# Patient Record
Sex: FEMALE | URBAN - METROPOLITAN AREA
[De-identification: names, ages, dates, MRNs, and addresses within clinical notes are randomized per-mention and may not be internally consistent; named-entity substitution may affect disease eponyms.]

---

## 2023-07-20 ENCOUNTER — OFFICE VISIT (OUTPATIENT)
Dept: PLASTIC SURGERY | Facility: CLINIC | Age: 68
End: 2023-07-20

## 2023-07-20 DIAGNOSIS — Z41.1 ENCOUNTER FOR COSMETIC PROCEDURE: Primary | ICD-10-CM

## 2023-07-20 NOTE — NURSING NOTE
Updated photodocumentation obtained.    Patient given quote for cosmetic procedures at appointment today. Quote was explained in detail, including but not limited to the $500.00 non-refundable deposit due at time of scheduling, when/where payment is due, facility fees being subject to change, and six weeks minimum cancellation notice. Patient verbalized understanding of quote. Signed quote was obtained (see media tab), a copy sent home with pt. Education for quoted procedures was provided both verbally and in educational take home folder including pre & post op care, medications to avoid before and after surgery, and more. All questions answered at this time. Pt will reach out if questions arise.    Sherita Pearce RN  7/20/2023 5:15 PM

## 2023-07-20 NOTE — LETTER
July 20, 2023  Re: Carina Townsend  1955      Dear Dr. Max,    Thank you so much for referring Carina Townsend to the [facility]. I had the pleasure of visiting with Carina today.     Attached you will find a copy of my note. Please feel free to reach out to me with any questions, [phone].     This office note has been dictated.         Your trust in our practice and care is much appreciated.    Sincerely,    ANGEL STEVENSON MD

## 2023-07-20 NOTE — LETTER
7/20/2023       RE: Carina Townsend  [address] [address]       Dear Colleague,      Thank you for referring your patient, Carina Townsend, to the [facility] at [facility]. Please see a copy of my visit note below.    This office note has been dictated.         Again, thank you for allowing me to participate in the care of your patient.      Sincerely,    ANGEL STVEENSON MD

## 2023-07-24 NOTE — PROGRESS NOTES
Service Date: 07/20/2023    REASON FOR VISIT: Carina is in to see us for evaluation of facial rejuvenation.      HISTORY OF PRESENT ILLNESS: I have cared for her over the past with skin cancer reconstruction, particularly a composite graft from the left ear to the nose.  She wants to know about a face/neck lift.  I discussed with her a face/neck lift in the past.  She does not have any dry eye or visual problems.  She wears glasses.      ALLERGIES:  She has no known allergies.      MEDICATIONS:  She takes no medications.      HABITS:  She is a nonsmoker.      PHYSICAL EXAMINATION: She has Carbajal I skin, some volume deflation, relatively thin skin with lots of superficial rhytids.  She has had an endoscopic brow lift elsewhere in the past.  That has actually held up quite well.  She has some redundant upper eyelid skin but not a significant amount of ptosis.  She has significant lower lid pseudo fat herniation.  She has relatively good skin tone.  Some skin excess and again fine rhytids.  She has volume loss in the mid face.  She has significant platysmal banding and degradation of the platysmal muscle with stranding.      RECOMMENDATIONS/PLAN: I would recommend a submentoplasty with plication, a facelift, an upper lid blepharoplasty and a transconjunctival lower lid blepharoplasty with fat excision laterally, repositioning centrally and nasally, and a skin pinch.      The risks and benefits of surgery including blindness, double vision, infection, delayed healing, ecchymosis, hematoma, motor sensory nerve problems, irregular scars, hair loss, etc., were discussed. If she wants to proceed, I would be happy to help her.  Our staff will provide her a quote.  I would honor her previous quote from several years ago for the facelift.   She will be in contact with us if she has any questions.      Familia Mijares MD        D: 07/22/2023   T: 07/24/2023   MT: ms    Name:     CARINA SHELTON  MRN:      [id number]         Account:      [id number]   :      1955           Service Date: 2023       Document: [id number]

## 2023-11-11 DIAGNOSIS — Z98.890 POSTOPERATIVE STATE: Primary | ICD-10-CM

## 2023-11-11 RX ORDER — ACETAMINOPHEN 500 MG
500 TABLET ORAL EVERY 6 HOURS
Qty: 12 TABLET | Refills: 0 | Status: SHIPPED | OUTPATIENT
Start: 2023-11-11 | End: 2023-11-14

## 2023-11-11 RX ORDER — ONDANSETRON 4 MG/1
4 TABLET, ORALLY DISINTEGRATING ORAL EVERY 8 HOURS PRN
Qty: 12 TABLET | Refills: 0 | Status: SHIPPED | OUTPATIENT
Start: 2023-11-11

## 2023-11-11 RX ORDER — OXYCODONE HYDROCHLORIDE 5 MG/1
5 TABLET ORAL EVERY 6 HOURS PRN
Qty: 20 TABLET | Refills: 0 | Status: SHIPPED | OUTPATIENT
Start: 2023-11-11

## 2023-11-11 RX ORDER — CEPHALEXIN 500 MG/1
500 CAPSULE ORAL 2 TIMES DAILY
Qty: 14 CAPSULE | Refills: 0 | Status: SHIPPED | OUTPATIENT
Start: 2023-11-11 | End: 2023-11-18

## 2023-11-12 DIAGNOSIS — Z98.890 POSTOPERATIVE STATE: Primary | ICD-10-CM

## 2023-11-12 RX ORDER — TOBRAMYCIN 3 MG/ML
2 SOLUTION/ DROPS OPHTHALMIC EVERY 4 HOURS
Qty: 5 ML | Refills: 3 | Status: SHIPPED | OUTPATIENT
Start: 2023-11-12 | End: 2023-11-19

## 2023-11-15 ENCOUNTER — TRANSFERRED RECORDS (OUTPATIENT)
Dept: HEALTH INFORMATION MANAGEMENT | Facility: CLINIC | Age: 68
End: 2023-11-15

## 2023-11-16 ENCOUNTER — OFFICE VISIT (OUTPATIENT)
Dept: PLASTIC SURGERY | Facility: CLINIC | Age: 68
End: 2023-11-16

## 2023-11-16 DIAGNOSIS — Z98.890 POSTOPERATIVE STATE: Primary | ICD-10-CM

## 2023-11-16 NOTE — PROGRESS NOTES
Jb is back after her facelift yesterday and she has done well overnight.  She has had minimal drainage output the flaps are all intact, and all branches of the facial nerve are intact.  There is no hematoma.  She does have conjunctival edema bilaterally but her vision is good.  We placed the elastic garment today and talked about her cleansing and reviewed with her her medications again.  She will see us next week for suture removal.    ANGEL STEVENSON MD

## 2023-11-22 ENCOUNTER — OFFICE VISIT (OUTPATIENT)
Dept: PLASTIC SURGERY | Facility: CLINIC | Age: 68
End: 2023-11-22

## 2023-11-22 DIAGNOSIS — Z98.890 POSTOPERATIVE STATE: Primary | ICD-10-CM

## 2023-11-27 NOTE — PROGRESS NOTES
Saw Carina one week s/p face/neck lift, u/l blepharoplasty, & fat grafting to cheeks (11/15/23).    She is doing well and looks great! She reports she has had little to no pain, she has found the post op period to be easy. She is in positive spirits. We discussed continued cares and healing expectations. She verbalized understanding of post op cares, realistic expectations, and follow up. Her incisions were cleansed, they are well approximated. Sutures and staples removed without difficulty, spare two sutures above and below bilateral ears left for removal in one week. All of her questions were answered, she will call the clinic if new questions or concerns arise.    Follow-up scheduled.    Sherita Pearce RN  11/27/2023 10:28 AM

## 2023-11-29 ENCOUNTER — OFFICE VISIT (OUTPATIENT)
Dept: PLASTIC SURGERY | Facility: CLINIC | Age: 68
End: 2023-11-29

## 2023-11-29 DIAGNOSIS — Z98.890 POSTOPERATIVE STATE: Primary | ICD-10-CM

## 2023-12-01 ENCOUNTER — OFFICE VISIT (OUTPATIENT)
Dept: PLASTIC SURGERY | Facility: CLINIC | Age: 68
End: 2023-12-01

## 2023-12-01 DIAGNOSIS — Z98.890 POSTOPERATIVE STATE: Primary | ICD-10-CM

## 2023-12-01 DIAGNOSIS — H11.423 CHEMOSIS OF CONJUNCTIVA OF BOTH EYES: ICD-10-CM

## 2023-12-01 RX ORDER — TOBRAMYCIN AND DEXAMETHASONE 3; 1 MG/ML; MG/ML
1-2 SUSPENSION/ DROPS OPHTHALMIC 4 TIMES DAILY
Qty: 2.8 ML | Refills: 0 | Status: SHIPPED | OUTPATIENT
Start: 2023-12-01 | End: 2023-12-08

## 2023-12-01 NOTE — LETTER
December 1, 2023  Re: Carina Townsend  1955      Dear Dr. Max,    Thank you so much for referring Carina Townsend to the [facility]. I had the pleasure of visiting with Carina today.     Attached you will find a copy of my note. Please feel free to reach out to me with any questions, [phone].     Breann is back after her surgery and she is doing the eyedrops as recommended.  The Steri-Strips support of her lower lids are still present I removed the one on the left side as the lid was more inverted there.  I suspect that the suspension suture broke after surgery causing the left lid droop more than the right I discussed this with the patient.  With that in mind I put a Paris suture in on the left side put Xylocaine and then Xylocaine with epinephrine in and then used a 5-0 nylon and Telfa pledgets to suspend the lid with marked improvement in the lid posture Home cares were discussed.  Other wounds were cleaned.  New prescription for eyedrops was sent to her pharmacy and she will see us next Thursday.      Your trust in our practice and care is much appreciated.    Sincerely,    ANGEL STEVENSON MD

## 2023-12-01 NOTE — PROGRESS NOTES
Breann is back after her surgery and she is doing the eyedrops as recommended.  The Steri-Strips support of her lower lids are still present I removed the one on the left side as the lid was more inverted there.  I suspect that the suspension suture broke after surgery causing the left lid droop more than the right I discussed this with the patient.  With that in mind I put a Paris suture in on the left side put Xylocaine and then Xylocaine with epinephrine in and then used a 5-0 nylon and Telfa pledgets to suspend the lid with marked improvement in the lid posture Home cares were discussed.  Other wounds were cleaned.  New prescription for eyedrops was sent to her pharmacy and she will see us next Thursday.ANGEL STEVENSON MD

## 2023-12-01 NOTE — LETTER
12/1/2023       RE: Carina Townsend  [address] [address]       Dear Colleague,    Thank you for referring your patient, Carina Townsend, to the  [facility] at [facility]. Please see a copy of my visit note below.    Breann is back after her surgery and she is doing the eyedrops as recommended.  The Steri-Strips support of her lower lids are still present I removed the one on the left side as the lid was more inverted there.  I suspect that the suspension suture broke after surgery causing the left lid droop more than the right I discussed this with the patient.  With that in mind I put a Paris suture in on the left side put Xylocaine and then Xylocaine with epinephrine in and then used a 5-0 nylon and Telfa pledgets to suspend the lid with marked improvement in the lid posture Home cares were discussed.  Other wounds were cleaned.  New prescription for eyedrops was sent to her pharmacy and she will see us next Thursday.      Again, thank you for allowing me to participate in the care of your patient.      Sincerely,    ANGEL STEVENSON MD

## 2023-12-04 NOTE — PROGRESS NOTES
Tai Carina one week s/p face/neck lift, u/l bleph, & fat grafting to cheeks (11/15/23).    Incision cleansed with hydrogen peroxide. Remaining sutures removed from bilateral ears. Her incisions are well approximated, she is still edematous and erythremic as expected. She has been diligent with her cares. Pt has ectropion lower eyelids with left worse than right. Dr. George brought in to advise on care. Pts lower eyelids tapped to provide support. She is using a lubricating eyedrop. Image of pts eyelids shown to Dr. Mijares. She will follow up this Friday with Dr. Mijares to be further assessed and potentially have a frost suture placed. Until this appt, pt instructed to keep eyelids tapped. Pt sent home with tape. She should reach out if questions or concerns arise before her next scheduled follow up.    Follow-up scheduled.    Sherita Pearce RN  12/4/2023 11:01 AM

## 2023-12-07 ENCOUNTER — OFFICE VISIT (OUTPATIENT)
Dept: PLASTIC SURGERY | Facility: CLINIC | Age: 68
End: 2023-12-07

## 2023-12-07 DIAGNOSIS — Z98.890 POSTOPERATIVE STATE: Primary | ICD-10-CM

## 2023-12-07 NOTE — LETTER
December 7, 2023  Re: Carina Townsend  1955      Dear Dr. Max,    Thank you so much for referring Carina Townsend to the [facility]. I had the pleasure of visiting with Carina today.     Attached you will find a copy of my note. Please feel free to reach out to me with any questions, [phone].     Ele is in after her Paris suture last week and is provided marked improvement.  I removed the sutures today and I am going to have her use paper tape to support the lids as it benefits both sides.  She will continue with this approach and the moisturizers.  Ecchymosis is improved.  I will see her again in 2 weeks at that time I want to talk with her about starting some exercises overall she is very happy with the outcome.      Your trust in our practice and care is much appreciated.    Sincerely,    ANGEL STEVENSON MD

## 2023-12-07 NOTE — LETTER
12/7/2023       RE: Carina Townsend  [address] [address]       Dear Colleague,    Thank you for referring your patient, Carina Townsend, to the  [facility] at [facility]. Please see a copy of my visit note below.    Ele is in after her Paris suture last week and is provided marked improvement.  I removed the sutures today and I am going to have her use paper tape to support the lids as it benefits both sides.  She will continue with this approach and the moisturizers.  Ecchymosis is improved.  I will see her again in 2 weeks at that time I want to talk with her about starting some exercises overall she is very happy with the outcome.      Again, thank you for allowing me to participate in the care of your patient.      Sincerely,    ANGEL STEVENSON MD

## 2023-12-07 NOTE — PROGRESS NOTES
Ele is in after her Frost suture last week and is provided marked improvement.  I removed the sutures today and I am going to have her use paper tape to support the lids as it benefits both sides.  She will continue with this approach and the moisturizers.  Ecchymosis is improved.  I will see her again in 2 weeks at that time I want to talk with her about starting some exercises overall she is very happy with the outcome.  ANGEL STEVENSON MD

## 2023-12-21 ENCOUNTER — OFFICE VISIT (OUTPATIENT)
Dept: PLASTIC SURGERY | Facility: CLINIC | Age: 68
End: 2023-12-21

## 2023-12-21 DIAGNOSIS — Z98.890 POSTOPERATIVE STATE: Primary | ICD-10-CM

## 2023-12-21 NOTE — PROGRESS NOTES
Ele is in to see us today in follow-up of her facelift and eyelid surgery as she says she would like to have the facelift pulled tighter to eliminate the nasolabial creases and marionette lines.  She has a great cheery attitude about this and I informed her again that pulling so hard on the skin to efface the nasal labial creases and marionette lines does not work and distorts the mouth and unattractive way.  We talked about the use of filler and I used some of our teaching stock and injected 1 cc of Juvéderm ultra plus into the oral commissure grooves and marionette lines and small amount in the nasolabial crease on the right.  Her lateral lid on the left is still down slightly she is still taping it at night it is improved the right side is better than the left.  I think the canthopexy suture on the left may have broken at this point and just can have her continue to tape and carry out squinting exercises.  I would like for her to see Dr. George or Dr. Moreno in late January.  I do not think she would need a steroid injection but it is still quite problematic I would then start looking toward an in office canthopexy to be performed when I return.  Patient is understanding of this and will make proper arrangements with our staff.  I want to get photos the next time she is in.  ANGEL STEVENSON MD

## 2023-12-21 NOTE — LETTER
December 21, 2023  Re: Carina Townsend  1955      Dear Dr. Max,    Thank you so much for referring Carina Townsend to the [facility]. I had the pleasure of visiting with Carina today.     Attached you will find a copy of my note. Please feel free to reach out to me with any questions, [phone].     Ele is in to see us today in follow-up of her facelift and eyelid surgery as she says she would like to have the facelift pulled tighter to eliminate the nasolabial creases and marionette lines.  She has a great cheery attitude about this and I informed her again that pulling so hard on the skin to efface the nasal labial creases and marionette lines does not work and distorts the mouth and unattractive way.  We talked about the use of filler and I used some of our teaching stock and injected 1 cc of Juvéderm ultra plus into the oral commissure grooves and marionette lines and small amount in the nasolabial crease on the right.  Her lateral lid on the left is still down slightly she is still taping it at night it is improved the right side is better than the left.  I think the canthopexy suture on the left may have broken at this point and just can have her continue to tape and carry out squinting exercises.  I would like for her to see Dr. George or Dr. Moerno in late January.  I do not think she would need a steroid injection but it is still quite problematic I would then start looking toward an in office canthopexy to be performed when I return.  Patient is understanding of this and will make proper arrangements with our staff.  I want to get photos the next time she is in.      Your trust in our practice and care is much appreciated.    Sincerely,    ANGEL STEVENSON MD

## 2023-12-21 NOTE — LETTER
12/21/2023       RE: Carina Townsend  [address] [address]       Dear Colleague,    Thank you for referring your patient, Carina Townsend, to the  [facility] at [facility]. Please see a copy of my visit note below.    Ele is in to see us today in follow-up of her facelift and eyelid surgery as she says she would like to have the facelift pulled tighter to eliminate the nasolabial creases and marionette lines.  She has a great cheery attitude about this and I informed her again that pulling so hard on the skin to efface the nasal labial creases and marionette lines does not work and distorts the mouth and unattractive way.  We talked about the use of filler and I used some of our teaching stock and injected 1 cc of Juvéderm ultra plus into the oral commissure grooves and marionette lines and small amount in the nasolabial crease on the right.  Her lateral lid on the left is still down slightly she is still taping it at night it is improved the right side is better than the left.  I think the canthopexy suture on the left may have broken at this point and just can have her continue to tape and carry out squinting exercises.  I would like for her to see Dr. George or Dr. Moreno in late January.  I do not think she would need a steroid injection but it is still quite problematic I would then start looking toward an in office canthopexy to be performed when I return.  Patient is understanding of this and will make proper arrangements with our staff.  I want to get photos the next time she is in.      Again, thank you for allowing me to participate in the care of your patient.      Sincerely,    ANGEL STEVENSON MD

## 2024-01-17 ENCOUNTER — OFFICE VISIT (OUTPATIENT)
Dept: PLASTIC SURGERY | Facility: CLINIC | Age: 69
End: 2024-01-17

## 2024-01-17 DIAGNOSIS — Z98.890 POSTOPERATIVE STATE: Primary | ICD-10-CM

## 2024-01-17 NOTE — LETTER
1/17/2024       RE: Carina Townsend  [address] [address]     Dear Colleague,    Thank you for referring your patient, Carina Townsend, to the [facility] at [facility]. Please see a copy of my visit note below.    PROGRESS NOTE:    Patient: Carina Townsend  MRN: [id number]    1/17/2024    Carina returns today, now 2 months s/p face/necklift, bilateral upper and lower blepharoplasty and fat grafting to cheeks. She report she is doing well and is very happy with her result overall. She has been taping both lower lids and performing squinting exercises due to post-operative ectropion, which has improved overtime but remains, worse on the left (yet mild). No vision concerns or concerns related to eye dryness. She notes mild lumpy/bumpiness along her bilateral upper eyelid incisions and her incisions along her lateral canthus. She notes that she can feel/is aware of the the filler placed in her left nasolabial crease, but feels overall satisfaction with the filler trial she underwent at her last visit in December.    On exam, she is great spirits. Face/necklift and submental incisions healing well, are soft and flat. Improved cervicomental angle. Upper and lower lid incisions well healed, no significant scar hypertrophy present. Lower lids with mild ectropion bilaterally, slightly worse on the left. Full eye closure present bilaterally. Mild fullness of the left nasolabial fold > right.    A/P:  Overall healing well. Photos taken today.  No steroid injection needed today.  Continue lower lid taping nightly and squinting exercises.  Return in March/the spring upon Dr. Mijares's return to determine if lateral canthopexy procedure in office will be needed.  Discussed that the previously placed filler with continue to dissolve overtime, would not act to dissolve the mild fullness of the left nasolabial fold today- patient agreed. Additional  filler injections can be repeated if desired upon Dr. Stevenson's return.  Patient agreeable to this plan.    Nova Moreno MD  Facial Plastic and Reconstructive Surgery Fellow      Again, thank you for allowing me to participate in the care of your patient.      Sincerely,    ANGEL STEVENSON MD

## 2024-01-17 NOTE — PROGRESS NOTES
PROGRESS NOTE:    Patient: Carina Townsend  MRN: [id number]    1/17/2024    Carina returns today, now 2 months s/p face/necklift, bilateral upper and lower blepharoplasty and fat grafting to cheeks. She report she is doing well and is very happy with her result overall. She has been taping both lower lids and performing squinting exercises due to post-operative ectropion, which has improved overtime but remains, worse on the left (yet mild). No vision concerns or concerns related to eye dryness. She notes mild lumpy/bumpiness along her bilateral upper eyelid incisions and her incisions along her lateral canthus. She notes that she can feel/is aware of the the filler placed in her left nasolabial crease, but feels overall satisfaction with the filler trial she underwent at her last visit in December.    On exam, she is great spirits. Face/necklift and submental incisions healing well, are soft and flat. Improved cervicomental angle. Upper and lower lid incisions well healed, no significant scar hypertrophy present. Lower lids with mild ectropion bilaterally, slightly worse on the left. Full eye closure present bilaterally. Mild fullness of the left nasolabial fold > right.    A/P:  Overall healing well. Photos taken today.  No steroid injection needed today.  Continue lower lid taping nightly and squinting exercises.  Return in March/the spring upon Dr. Mijares's return to determine if lateral canthopexy procedure in office will be needed.  Discussed that the previously placed filler with continue to dissolve overtime, would not act to dissolve the mild fullness of the left nasolabial fold today- patient agreed. Additional filler injections can be repeated if desired upon Dr. Mijares's return.  Patient agreeable to this plan.    Nova Moreno MD  Facial Plastic and Reconstructive Surgery Fellow

## 2024-01-17 NOTE — LETTER
January 17, 2024  Re: Carina Townsend  1955    Dear Dr. Max,    Thank you so much for referring Carina Townsend to the [facility]. I had the pleasure of visiting with Carina today.     Attached you will find a copy of my note. Please feel free to reach out to me with any questions, [phone].     PROGRESS NOTE:    Patient: Carina Townsend  MRN: [id number]    1/17/2024    Carina returns today, now 2 months s/p face/necklift, bilateral upper and lower blepharoplasty and fat grafting to cheeks. She report she is doing well and is very happy with her result overall. She has been taping both lower lids and performing squinting exercises due to post-operative ectropion, which has improved overtime but remains, worse on the left (yet mild). No vision concerns or concerns related to eye dryness. She notes mild lumpy/bumpiness along her bilateral upper eyelid incisions and her incisions along her lateral canthus. She notes that she can feel/is aware of the the filler placed in her left nasolabial crease, but feels overall satisfaction with the filler trial she underwent at her last visit in December.    On exam, she is great spirits. Face/necklift and submental incisions healing well, are soft and flat. Improved cervicomental angle. Upper and lower lid incisions well healed, no significant scar hypertrophy present. Lower lids with mild ectropion bilaterally, slightly worse on the left. Full eye closure present bilaterally. Mild fullness of the left nasolabial fold > right.    A/P:  Overall healing well. Photos taken today.  No steroid injection needed today.  Continue lower lid taping nightly and squinting exercises.  Return in March/the spring upon Dr. Mijares's return to determine if lateral canthopexy procedure in office will be needed.  Discussed that the previously placed filler with continue to dissolve overtime, would not act to dissolve the mild fullness of the left nasolabial fold today- patient  agreed. Additional filler injections can be repeated if desired upon Dr. Stevenson's return.  Patient agreeable to this plan.    Nova Moreno MD  Facial Plastic and Reconstructive Surgery Fellow        Your trust in our practice and care is much appreciated.    Sincerely,    ANGEL STEVENSON MD

## 2024-03-13 ENCOUNTER — OFFICE VISIT (OUTPATIENT)
Dept: PLASTIC SURGERY | Facility: CLINIC | Age: 69
End: 2024-03-13

## 2024-03-13 DIAGNOSIS — Z98.890 POSTOPERATIVE STATE: Primary | ICD-10-CM

## 2024-03-13 NOTE — PROGRESS NOTES
Updated photodocumentation obtained (see media tab).    Pt informed btx is $18/unit, she would need to be assessed by Dr. George to give her an exact estimate and determine what btx and filler treatment is appropriate for her.    Pt scheduled consult with Dr. George, per the advice of Dr. Mijares, to further discuss.    Sherita Pearce RN  3/13/2024 3:26 PM

## 2024-03-13 NOTE — LETTER
3/13/2024       RE: Carina Townsend  [address] [address]     Dear Colleague,    Thank you for referring your patient, Carina Townsend, to the [facility] at [facility]. Please see a copy of my visit note below.    Updated photodocumentation obtained (see media tab).    Pt informed btx is $18/unit, she would need to be assessed by Dr. George to give her an exact estimate and determine what btx and filler treatment is appropriate for her.    Pt scheduled consult with Dr. George, per the advice of Dr. Mijares, to further discuss.    Sherita Pearce, MECHELLE  3/13/2024 3:26 PM      Carina is in after her surgery she is absolutely delighted with the outcome.  She notes some wrinkling in the lower lids and some hyperpigmentation she has fine rhytids over the entire facial area.  I think she would benefit from chemical peel particular of the lower lids as this will efface the rhytids and decrease in the pigmentation.  I did have her see Dr. Coulter for chemical peel and I told her that it is possible that Dr. Rodrigues would recommend a full face..  Staff will make these arrangements.ANGEL MIJARES MD       Again, thank you for allowing me to participate in the care of your patient.      Sincerely,    ANGEL MIJARES MD

## 2024-03-13 NOTE — LETTER
March 13, 2024  Re: Carina Townsend  1955    Dear Dr. Max,    Thank you so much for referring Carina Townsend to the [facility]. I had the pleasure of visiting with Carina today.     Attached you will find a copy of my note. Please feel free to reach out to me with any questions, [phone].     Updated photodocumentation obtained (see media tab).    Pt informed btx is $18/unit, she would need to be assessed by Dr. George to give her an exact estimate and determine what btx and filler treatment is appropriate for her.    Pt scheduled consult with Dr. George, per the advice of Dr. Mijares, to further discuss.    Sherita Pearce RN  3/13/2024 3:26 PM      Carina is in after her surgery she is absolutely delighted with the outcome.  She notes some wrinkling in the lower lids and some hyperpigmentation she has fine rhytids over the entire facial area.  I think she would benefit from chemical peel particular of the lower lids as this will efface the rhytids and decrease in the pigmentation.  I did have her see Dr. Coulter for chemical peel and I told her that it is possible that Dr. Rodrigues would recommend a full face..  Staff will make these arrangements.      Your trust in our practice and care is much appreciated.    Sincerely,    ANGEL MIJARES MD

## 2024-03-13 NOTE — PROGRESS NOTES
Carina is in after her surgery she is absolutely delighted with the outcome.  She notes some wrinkling in the lower lids and some hyperpigmentation she has fine rhytids over the entire facial area.  I think she would benefit from chemical peel particular of the lower lids as this will efface the rhytids and decrease in the pigmentation.  I did have her see Dr. Coulter for chemical peel and I told her that it is possible that Dr. Rodrigues would recommend a full face..  Staff will make these arrangements.ANGEL STEVENSON MD

## 2024-09-25 ENCOUNTER — OFFICE VISIT (OUTPATIENT)
Dept: PLASTIC SURGERY | Facility: CLINIC | Age: 69
End: 2024-09-25

## 2024-09-25 DIAGNOSIS — Z41.1 ENCOUNTER FOR COSMETIC PROCEDURE: Primary | ICD-10-CM

## 2024-09-25 NOTE — NURSING NOTE
Gave pt a cosmetic quote for upper eyelid revision (see Media tab).    Discussed quote/GFE in detail with pt, including the fact that anesthesia and facility fees are an estimate based on time and may be subject to change. We also discussed that a non-refundable deposit of 50% of the surgeon's fee is collected at the time of scheduling, with the remaining surgeon's fee due three weeks prior to surgery.    Pt verbalized understanding of financial responsibility if she decides to pursue cosmetic surgery.    Brisa Osei RN  9/25/2024 2:42 PM

## 2024-09-25 NOTE — PROGRESS NOTES
Ele is in in follow-up of her blepharoplasty and facelift surgery.  She notes some increased depth of the oral commissure grooves and a secondary smile line removed she also finds that there are a couple small papules in the left upper eyelid incision line laterally that trouble her.  These are barely detectable but her failure problematic enough for her I would resect them and her in the office charging her basically are the supplies.  For the oral commissure grooves and the secondary smile lines I think she would do well with fillers.  We discussed the fundamental principles of that and I would have her see Dr. Mccloud for treatment.  She is aware that these are temporary corrections but did not provide meaningful benefit.  She will see me as needed and can schedule eyelid surgery if she wants in the future.ANGEL STEVENSON MD

## 2024-09-25 NOTE — LETTER
9/25/2024       RE: Carina Townsend  [address] [address]     Dear Colleague,    Thank you for referring your patient, Carina Townsend, to the  [facility] at [facility]. Please see a copy of my visit note below.    Ele is in in follow-up of her blepharoplasty and facelift surgery.  She notes some increased depth of the oral commissure grooves and a secondary smile line removed she also finds that there are a couple small papules in the left upper eyelid incision line laterally that trouble her.  These are barely detectable but her failure problematic enough for her I would resect them and her in the office charging her basically are the supplies.  For the oral commissure grooves and the secondary smile lines I think she would do well with fillers.  We discussed the fundamental principles of that and I would have her see Dr. Mccloud for treatment.  She is aware that these are temporary corrections but did not provide meaningful benefit.  She will see me as needed and can schedule eyelid surgery if she wants in the future.ANGEL STEVENSON MD       Again, thank you for allowing me to participate in the care of your patient.      Sincerely,    ANGEL STEVENSON MD

## 2024-09-25 NOTE — LETTER
September 25, 2024  Re: Carina Townsend  1955    Dear Dr. Max,    Thank you so much for referring Carina Townsend to the [facility]. I had the pleasure of visiting with Carina today.     Attached you will find a copy of my note. Please feel free to reach out to me with any questions, [phone].     Ele is in in follow-up of her blepharoplasty and facelift surgery.  She notes some increased depth of the oral commissure grooves and a secondary smile line removed she also finds that there are a couple small papules in the left upper eyelid incision line laterally that trouble her.  These are barely detectable but her failure problematic enough for her I would resect them and her in the office charging her basically are the supplies.  For the oral commissure grooves and the secondary smile lines I think she would do well with fillers.  We discussed the fundamental principles of that and I would have her see Dr. Mccloud for treatment.  She is aware that these are temporary corrections but did not provide meaningful benefit.  She will see me as needed and can schedule eyelid surgery if she wants in the future.ANGEL STEVENSON MD         Your trust in our practice and care is much appreciated.    Sincerely,  ANGEL STEVENSON MD

## 2024-10-24 ENCOUNTER — OFFICE VISIT (OUTPATIENT)
Dept: PLASTIC SURGERY | Facility: CLINIC | Age: 69
End: 2024-10-24

## 2024-10-24 DIAGNOSIS — Z98.890 POSTOPERATIVE STATE: Primary | ICD-10-CM

## 2024-10-24 RX ORDER — HYDROCORTISONE VALERATE CREAM 2 MG/G
CREAM TOPICAL 2 TIMES DAILY
Qty: 15 G | Refills: 0 | Status: SHIPPED | OUTPATIENT
Start: 2024-10-24 | End: 2024-11-03

## 2024-10-24 NOTE — LETTER
October 24, 2024  Re: Carina Townsend  1955    Dear Dr. Max,    Thank you so much for referring Carina Townsend to the [facility]. I had the pleasure of visiting with Carina today.     Attached you will find a copy of my note. Please feel free to reach out to me with any questions, [phone].     Carlos A is in the see us in follow-up of her surgery.  She noted last week development of erythema in her lower lids but seems to fluctuate during the day.  She also notes still some small bumps at the lateral portion of the upper lid blepharoplasty incision and a very subtle contour irregularity on the right at the most lateral end of the lower lid incision.  The scars on her lids as expected are hypopigmented and I do not think they were going to change this.  Inflammation in lower lids is new and I do not have an adequate explanation for it.  There is some small telangiectatic vessels.  She has not had any new make-up or irritants or cleansing products.  I told her I would try mild cortisone cream such as Westcort and if that makes a difference she can use that for several days.  If not she will make arrangements to see her dermatologist in [place] area.  She also has some further questions about the secondary smile lines in the oral commissure grooves.  These are slightly more prominent on the left than the right.  I had her see my partner for injectables.  We discussed that again.  She says that from a financial perspective that is meant to something she can forward at this point.  I told her that timing is not crucial on it and she can do it anytime.  I also told her that if we were to resect the bumps in her legs there would still be a hypopigmented scar and that in fact the changes are so subtle I would recommend that she not do anything with them at this point.  She is comfortable with that approach.  She notes some of the recurrence of laxity in the jawline and neck.  I told her indeed as time  passes an unpredictable fashion laxity does have a tendency to recur but is not something that I would not recommend that she consider doing anything about now.  She was seen in an as-needed basis.  I asked her to give us a call next week and let us know if the cream is making any difference.ANGEL STEVENSON MD         Your trust in our practice and care is much appreciated.    Sincerely,  ANGEL STEVENSON MD

## 2024-10-24 NOTE — LETTER
10/24/2024       RE: Carina Townsend  [address] [address]     Dear Colleague,    Thank you for referring your patient, Carina Townsend, to the [facility] at [facility]. Please see a copy of my visit note below.    Carlos A is in the see us in follow-up of her surgery.  She noted last week development of erythema in her lower lids but seems to fluctuate during the day.  She also notes still some small bumps at the lateral portion of the upper lid blepharoplasty incision and a very subtle contour irregularity on the right at the most lateral end of the lower lid incision.  The scars on her lids as expected are hypopigmented and I do not think they were going to change this.  Inflammation in lower lids is new and I do not have an adequate explanation for it.  There is some small telangiectatic vessels.  She has not had any new make-up or irritants or cleansing products.  I told her I would try mild cortisone cream such as Westcort and if that makes a difference she can use that for several days.  If not she will make arrangements to see her dermatologist in [place] area.  She also has some further questions about the secondary smile lines in the oral commissure grooves.  These are slightly more prominent on the left than the right.  I had her see my partner for injectables.  We discussed that again.  She says that from a financial perspective that is meant to something she can forward at this point.  I told her that timing is not crucial on it and she can do it anytime.  I also told her that if we were to resect the bumps in her legs there would still be a hypopigmented scar and that in fact the changes are so subtle I would recommend that she not do anything with them at this point.  She is comfortable with that approach.  She notes some of the recurrence of laxity in the jawline and neck.  I told her indeed as time passes an  unpredictable fashion laxity does have a tendency to recur but is not something that I would not recommend that she consider doing anything about now.  She was seen in an as-needed basis.  I asked her to give us a call next week and let us know if the cream is making any difference.ANGEL STEVENSON MD       Again, thank you for allowing me to participate in the care of your patient.      Sincerely,    ANGEL STEVENSON MD

## 2024-10-24 NOTE — PROGRESS NOTES
Carlos A is in the see us in follow-up of her surgery.  She noted last week development of erythema in her lower lids but seems to fluctuate during the day.  She also notes still some small bumps at the lateral portion of the upper lid blepharoplasty incision and a very subtle contour irregularity on the right at the most lateral end of the lower lid incision.  The scars on her lids as expected are hypopigmented and I do not think they were going to change this.  Inflammation in lower lids is new and I do not have an adequate explanation for it.  There is some small telangiectatic vessels.  She has not had any new make-up or irritants or cleansing products.  I told her I would try mild cortisone cream such as Westcort and if that makes a difference she can use that for several days.  If not she will make arrangements to see her dermatologist in [place] area.  She also has some further questions about the secondary smile lines in the oral commissure grooves.  These are slightly more prominent on the left than the right.  I had her see my partner for injectables.  We discussed that again.  She says that from a financial perspective that is meant to something she can forward at this point.  I told her that timing is not crucial on it and she can do it anytime.  I also told her that if we were to resect the bumps in her legs there would still be a hypopigmented scar and that in fact the changes are so subtle I would recommend that she not do anything with them at this point.  She is comfortable with that approach.  She notes some of the recurrence of laxity in the jawline and neck.  I told her indeed as time passes an unpredictable fashion laxity does have a tendency to recur but is not something that I would not recommend that she consider doing anything about now.  She was seen in an as-needed basis.  I asked her to give us a call next week and let us know if the cream is making any difference.ANGEL STEVENSON MD

## 2024-10-24 NOTE — NURSING NOTE
Prescription for Westcort cream was sent to the patient's [facility]'s pharmacy in [place].    Brisa Osei RN  10/24/2024 4:50 PM